# Patient Record
Sex: FEMALE | Race: WHITE | NOT HISPANIC OR LATINO | Employment: OTHER | URBAN - METROPOLITAN AREA
[De-identification: names, ages, dates, MRNs, and addresses within clinical notes are randomized per-mention and may not be internally consistent; named-entity substitution may affect disease eponyms.]

---

## 2021-01-30 ENCOUNTER — IMMUNIZATIONS (OUTPATIENT)
Dept: FAMILY MEDICINE CLINIC | Facility: HOSPITAL | Age: 76
End: 2021-01-30

## 2021-01-30 DIAGNOSIS — Z23 ENCOUNTER FOR IMMUNIZATION: Primary | ICD-10-CM

## 2021-01-30 PROCEDURE — 91301 SARS-COV-2 / COVID-19 MRNA VACCINE (MODERNA) 100 MCG: CPT

## 2021-01-30 PROCEDURE — 0011A SARS-COV-2 / COVID-19 MRNA VACCINE (MODERNA) 100 MCG: CPT

## 2021-02-27 ENCOUNTER — IMMUNIZATIONS (OUTPATIENT)
Dept: FAMILY MEDICINE CLINIC | Facility: HOSPITAL | Age: 76
End: 2021-02-27

## 2021-02-27 DIAGNOSIS — Z23 ENCOUNTER FOR IMMUNIZATION: Primary | ICD-10-CM

## 2021-02-27 PROCEDURE — 0012A SARS-COV-2 / COVID-19 MRNA VACCINE (MODERNA) 100 MCG: CPT

## 2021-02-27 PROCEDURE — 91301 SARS-COV-2 / COVID-19 MRNA VACCINE (MODERNA) 100 MCG: CPT

## 2021-05-25 ENCOUNTER — TELEPHONE (OUTPATIENT)
Dept: OBGYN CLINIC | Facility: CLINIC | Age: 76
End: 2021-05-25

## 2021-05-25 NOTE — TELEPHONE ENCOUNTER
Becky Mormon called stating [facility] needs a new prescription for her zoloft  Advised patient one year supply was transmitted on 3/9/2021  She was attempting to refill off of old,  RX#    She will contact [facility] to initiate refill of new rx from 3/9/2021

## 2022-02-07 PROBLEM — M81.0 AGE-RELATED OSTEOPOROSIS WITHOUT CURRENT PATHOLOGICAL FRACTURE: Status: ACTIVE | Noted: 2022-02-07

## 2022-05-02 PROBLEM — F41.9 ANXIETY: Status: ACTIVE | Noted: 2022-05-02

## 2022-05-03 ENCOUNTER — ANNUAL EXAM (OUTPATIENT)
Dept: OBGYN CLINIC | Facility: CLINIC | Age: 77
End: 2022-05-03
Payer: MEDICARE

## 2022-05-03 VITALS
DIASTOLIC BLOOD PRESSURE: 78 MMHG | BODY MASS INDEX: 36.84 KG/M2 | SYSTOLIC BLOOD PRESSURE: 140 MMHG | WEIGHT: 200.2 LBS | HEIGHT: 62 IN

## 2022-05-03 DIAGNOSIS — F41.9 ANXIETY: ICD-10-CM

## 2022-05-03 DIAGNOSIS — Z01.419 ENCOUNTER FOR GYNECOLOGICAL EXAMINATION (GENERAL) (ROUTINE) WITHOUT ABNORMAL FINDINGS: Primary | ICD-10-CM

## 2022-05-03 DIAGNOSIS — Z12.31 ENCOUNTER FOR SCREENING MAMMOGRAM FOR MALIGNANT NEOPLASM OF BREAST: ICD-10-CM

## 2022-05-03 PROCEDURE — G0101 CA SCREEN;PELVIC/BREAST EXAM: HCPCS | Performed by: OBSTETRICS & GYNECOLOGY

## 2022-05-03 RX ORDER — ASPIRIN 81 MG/1
81 TABLET ORAL DAILY
COMMUNITY
Start: 2021-05-05

## 2022-05-03 RX ORDER — AMOXICILLIN 500 MG/1
CAPSULE ORAL AS NEEDED
COMMUNITY
Start: 2022-03-27

## 2022-05-03 NOTE — ASSESSMENT & PLAN NOTE
All well  Anxiety recently exacerbated with sale of 2 litters of Siamese kittens, in general happy with Zoloft effects  Has concerns because of mother's history of dementia  Will follow up with Dr Geovanni Drummond for possible testing  Normal breast and pelvic exams  Discussed recommendations to d/c pap smear screening in low risk individuals after 65  Mammo order given  Dexa with Dr Rachelle Cerda     Declined colonoscopies in past, had Cologuard in 2017

## 2022-05-03 NOTE — PROGRESS NOTES
Assessment/Plan:    Anxiety - Managed on Zoloft  Still with some anxiety    Encounter for gynecological examination (general) (routine) without abnormal findings  All well  Anxiety recently exacerbated with sale of 2 litters of Siamese kittens, in general happy with Zoloft effects  Has concerns because of mother's history of dementia  Will follow up with Dr Ashley Mancia for possible testing  Normal breast and pelvic exams  Discussed recommendations to d/c pap smear screening in low risk individuals after 65  Mammo order given  Dexa with Dr Stephanie Eugene  Declined colonoscopies in past, had Cologuard in 2017       Diagnoses and all orders for this visit:    Encounter for gynecological examination (general) (routine) without abnormal findings    Encounter for screening mammogram for malignant neoplasm of breast  -     Mammo screening bilateral w 3d & cad; Future    Anxiety - Managed on Zoloft  -     sertraline (ZOLOFT) 50 mg tablet; Take 1 tablet (50 mg total) by mouth daily    Other orders  -     amoxicillin (AMOXIL) 500 mg capsule; as needed  -     aspirin (ECOTRIN LOW STRENGTH) 81 mg EC tablet; Take 81 mg by mouth daily  -     Discontinue: sertraline (ZOLOFT) 50 mg tablet          Subjective:      Patient ID: Bonnie Napier is a 68 y o  female  Here for Medicare biannual breast and pelvic exams  The following portions of the patient's history were reviewed and updated as appropriate:   She  has a past medical history of Anxiety, Colon cancer screening - Cologuard (2017), Fracture, ribs (08/2020), and Osteoporosis  She   Patient Active Problem List    Diagnosis Date Noted    Encounter for gynecological examination (general) (routine) without abnormal findings 05/03/2022    Anxiety - Managed on Zoloft 05/02/2022    Age-related osteoporosis without current pathological fracture 02/07/2022     She  has a past surgical history that includes Tubal ligation; Appendectomy (2018);  Tonsillectomy; Mammo (historical); DXA procedure(historical) (07/15/2020); and Replacement total knee (Right, 05/2021)  Her family history includes Prostate cancer in her father  She  reports that she has never smoked  She has never used smokeless tobacco  She reports that she does not drink alcohol and does not use drugs  Current Outpatient Medications   Medication Sig Dispense Refill    amoxicillin (AMOXIL) 500 mg capsule as needed      aspirin (ECOTRIN LOW STRENGTH) 81 mg EC tablet Take 81 mg by mouth daily      sertraline (ZOLOFT) 50 mg tablet Take 1 tablet (50 mg total) by mouth daily 90 tablet 3     No current facility-administered medications for this visit  She has No Known Allergies       Review of Systems  No PMB, breast, bladder, bowel changes   No new persistent pain, bloating, early satiety or pelvic pressure    Objective:      /78   Ht 5' 2" (1 575 m)   Wt 90 8 kg (200 lb 3 2 oz)   BMI 36 62 kg/m²          Physical Exam  General appearance: no distress, pleasant  Does not appear anxious or depressed  Neck: thyroid without nodules or thyromegaly, no palpable adenopathy  Lymph nodes: no palpable adenopathy  Breasts: no masses, nodes or skin changes  Abdomen: soft, non tender, no palpable masses  Pelvic exam: normal atrophic external genitalia, urethral meatus normal, vagina atrophic without lesions, cervix atrophic without lesions, uterus small, non tender, no adnexal masses, non tender  Rectal exam: normal sphincter tone, no masses, RV confirms above

## 2022-05-03 NOTE — PATIENT INSTRUCTIONS
Return to office in one year unless having any problems such as breast changes, bleeding, new persistent pain, new progressive bloating, new problems eating (getting full to quickly) or new constant urinary pressure that does not resolve in one week  Talk to Dr Claudio Renae about your memory concerns

## 2022-05-03 NOTE — LETTER
May 3, 2022     [facility], [address]  [address] [address] [address]    Patient: Soco Ashraf   YOB: 1945   Date of Visit: 5/3/2022       Dear Dr Tiesha Dempsey: Thank you for referring Soco Ashraf to me for evaluation  Below are my notes for this consultation  If you have questions, please do not hesitate to call me  I look forward to following your patient along with you  Sincerely,        Karena Benavides MD        CC: No Recipients  Karena Benavides MD  5/3/2022  1:17 PM  Incomplete  Assessment/Plan:    Anxiety - Managed on Zoloft  Still with some anxiety    Encounter for gynecological examination (general) (routine) without abnormal findings  All well  Anxiety recently exacerbated with sale of 2 litters of Siamese kittens, in general happy with Zoloft effects  Has concerns because of mother's history of dementia  Will follow up with Dr Tiesha Dempsey for possible testing  Normal breast and pelvic exams  Discussed recommendations to d/c pap smear screening in low risk individuals after 65  Mammo order given  Dexa with Dr Bill Delvalle  Declined colonoscopies in past, had Cologuard in 2017       Diagnoses and all orders for this visit:    Encounter for gynecological examination (general) (routine) without abnormal findings    Encounter for screening mammogram for malignant neoplasm of breast  -     Mammo screening bilateral w 3d & cad; Future    Anxiety - Managed on Zoloft  -     sertraline (ZOLOFT) 50 mg tablet; Take 1 tablet (50 mg total) by mouth daily    Other orders  -     amoxicillin (AMOXIL) 500 mg capsule; as needed  -     aspirin (ECOTRIN LOW STRENGTH) 81 mg EC tablet; Take 81 mg by mouth daily  -     Discontinue: sertraline (ZOLOFT) 50 mg tablet          Subjective:      Patient ID: Soco Ashraf is a 68 y o  female  Here for Medicare biannual breast and pelvic exams         The following portions of the patient's history were reviewed and updated as appropriate:   She has a past medical history of Anxiety, Colon cancer screening - Cologaleksey (2017), Fracture, ribs (08/2020), and Osteoporosis  She   Patient Active Problem List    Diagnosis Date Noted    Encounter for gynecological examination (general) (routine) without abnormal findings 05/03/2022    Anxiety - Managed on Zoloft 05/02/2022    Age-related osteoporosis without current pathological fracture 02/07/2022     She  has a past surgical history that includes Tubal ligation; Appendectomy (2018); Tonsillectomy; Mammo (historical); DXA procedure(historical) (07/15/2020); and Replacement total knee (Right, 05/2021)  Her family history includes Prostate cancer in her father  She  reports that she has never smoked  She has never used smokeless tobacco  She reports that she does not drink alcohol and does not use drugs  Current Outpatient Medications   Medication Sig Dispense Refill    amoxicillin (AMOXIL) 500 mg capsule as needed      aspirin (ECOTRIN LOW STRENGTH) 81 mg EC tablet Take 81 mg by mouth daily      sertraline (ZOLOFT) 50 mg tablet Take 1 tablet (50 mg total) by mouth daily 90 tablet 3     No current facility-administered medications for this visit  She has No Known Allergies       Review of Systems  No PMB, breast, bladder, bowel changes   No new persistent pain, bloating, early satiety or pelvic pressure    Objective:      /78   Ht 5' 2" (1 575 m)   Wt 90 8 kg (200 lb 3 2 oz)   BMI 36 62 kg/m²          Physical Exam  General appearance: no distress, pleasant  Does not appear anxious or depressed  Neck: thyroid without nodules or thyromegaly, no palpable adenopathy  Lymph nodes: no palpable adenopathy  Breasts: no masses, nodes or skin changes  Abdomen: soft, non tender, no palpable masses  Pelvic exam: normal atrophic external genitalia, urethral meatus normal, vagina atrophic without lesions, cervix atrophic without lesions, uterus small, non tender, no adnexal masses, non tender  Rectal exam: normal sphincter tone, no masses, RV confirms above      Kunal Peralta MD  5/3/2022 12:57 PM  Sign when Signing Visit  Assessment/Plan:    No problem-specific Assessment & Plan notes found for this encounter  There are no diagnoses linked to this encounter  Subjective:      Patient ID: Megan Ordoñez is a 68 y o  female  Here for Medicare biannual breast and pelvic exams  The following portions of the patient's history were reviewed and updated as appropriate:   She  has a past medical history of Anxiety, Colon cancer screening - Cologuard (2017), Fracture, ribs (08/2020), and Osteoporosis  She   Patient Active Problem List    Diagnosis Date Noted    Anxiety - Managed on Zoloft 05/02/2022    Age-related osteoporosis without current pathological fracture 02/07/2022     She  has a past surgical history that includes Tubal ligation; Appendectomy (2018); Tonsillectomy; Mammo (historical); and DXA procedure(historical) (07/15/2020)  Her family history includes Prostate cancer in her father  She  reports that she has never smoked  She does not have any smokeless tobacco history on file  No history on file for alcohol use and drug use  No current outpatient medications on file  No current facility-administered medications for this visit  She has no allergies on file       Review of Systems  No PMB, breast, bladder, bowel changes  No new persistent pain, bloating, early satiety or pelvic pressure    Objective: There were no vitals taken for this visit           Physical Exam  General appearance: no distress, pleasant  Neck: thyroid without nodules or thyromegaly, no palpable adenopathy  Lymph nodes: no palpable adenopathy  Breasts: no masses, nodes or skin changes  Abdomen: soft, non tender, no palpable masses  Pelvic exam: normal atrophic external genitalia, urethral meatus normal, vagina atrophic without lesions, cervix atrophic without lesions, uterus small, non tender, no adnexal masses, non tender  Rectal exam: normal sphincter tone, no masses, RV confirms above

## 2023-06-22 ENCOUNTER — TELEPHONE (OUTPATIENT)
Dept: OBGYN CLINIC | Facility: CLINIC | Age: 78
End: 2023-06-22

## 2023-06-22 DIAGNOSIS — F41.9 ANXIETY: ICD-10-CM

## 2023-06-22 NOTE — TELEPHONE ENCOUNTER
Has 18  Fertraline HCL 50 mg left until appointment on 7/28, was unaware her yearly was not made  Requesting to have a month supply order through her [employer] mail order  Call back [phone]

## 2023-06-22 NOTE — TELEPHONE ENCOUNTER
Please review for Sertraline RX refill request    [facility] scheduled for 7/28/23

## 2023-07-27 NOTE — PROGRESS NOTES
Assessment/Plan:    Right lower quadrant pain  Two weeks of mid right lower quadrant intermittent pain and bowels "not right". Questions bloating. Has had increase in anxiety due to 81 yo partner's health issues. No diarrhea/constipation/blood in stool but hard to go. Normal abdominal and pelvic exams. No colonoscopy to date, had cologuard testing 2017  Recommend GI evaluation, referral and provider # given. Anxiety - Managed on Zoloft  Doing well on sertraline in general, some recent stressors. Refills sent. Age-related osteoporosis without current pathological fracture  Follows with Dr Francisco Javier Patel. Encounter for screening mammogram for malignant neoplasm of breast  No change in self or clinical breast exams. Mammo order given, last 8/9/22       Diagnoses and all orders for this visit:    Right lower quadrant pain  -     Ambulatory Referral to Gastroenterology; Future    Anxiety - Managed on Zoloft  -     sertraline (ZOLOFT) 50 mg tablet; Take 1 tablet (50 mg total) by mouth daily    Age-related osteoporosis without current pathological fracture    Encounter for screening mammogram for malignant neoplasm of breast  -     Mammo screening bilateral w 3d & cad; Future    Colon cancer screening  -     Ambulatory Referral to Gastroenterology; Future          Subjective:      Patient ID: Marcus Mcmullen is a 68 y.o. female. HPI Here for annual follow up to sertraline therapy and c/o right sided pain x 2 weeks. The following portions of the patient's history were reviewed and updated as appropriate:   She  has a past medical history of Anxiety, Colon cancer screening - Cologuard (2017), Fracture, ribs (08/2020), and Osteoporosis.   She   Patient Active Problem List    Diagnosis Date Noted   • Right lower quadrant pain 07/28/2023   • Encounter for screening mammogram for malignant neoplasm of breast 07/28/2023   • Encounter for gynecological examination (general) (routine) without abnormal findings 05/03/2022   • Anxiety - Managed on Zoloft 05/02/2022   • Age-related osteoporosis without current pathological fracture 02/07/2022     She  has a past surgical history that includes Tubal ligation; Appendectomy (2018); Tonsillectomy; Mammo (historical); DXA procedure(historical) (07/15/2020); and Replacement total knee (Right, 05/2021). Her family history includes Prostate cancer in her father. She  reports that she has never smoked. She has never been exposed to tobacco smoke. She has never used smokeless tobacco. She reports that she does not drink alcohol and does not use drugs. Current Outpatient Medications   Medication Sig Dispense Refill   • aspirin (ECOTRIN LOW STRENGTH) 81 mg EC tablet Take 81 mg by mouth daily     • sertraline (ZOLOFT) 50 mg tablet Take 1 tablet (50 mg total) by mouth daily 90 tablet 4     No current facility-administered medications for this visit. She has No Known Allergies. .    Review of Systems  RLQ pain x 2 weeks, bowel dysfunction. No breast concerns. No PMB, bladder pressure, early satiety. +weigth gain, ?bloating    Objective:      /84 (BP Location: Left arm, Patient Position: Sitting, Cuff Size: Standard)   Ht 5' 4" (1.626 m)   Wt 91.2 kg (201 lb)   BMI 34.50 kg/m²          Physical Exam    General appearance: no distress, pleasant, anxious  Neck: thyroid without nodules or thyromegaly, no palpable adenopathy  Lymph nodes: no palpable adenopathy  Breasts: no masses, nodes or skin changes  Abdomen: soft, non tender, no palpable masses, no guarding or rebound.  Area of tenderness minimal RLQ to mid right LQ  Pelvic exam: normal atrophic external genitalia, urethral meatus normal, vagina atrophic without lesions, cervix atrophic without lesions, uterus small, non tender, no adnexal masses, non tender  Rectal exam: normal sphincter tone, no masses, RV confirms above

## 2023-07-28 ENCOUNTER — OFFICE VISIT (OUTPATIENT)
Dept: OBGYN CLINIC | Facility: CLINIC | Age: 78
End: 2023-07-28
Payer: MEDICARE

## 2023-07-28 ENCOUNTER — TELEPHONE (OUTPATIENT)
Age: 78
End: 2023-07-28

## 2023-07-28 VITALS
DIASTOLIC BLOOD PRESSURE: 84 MMHG | SYSTOLIC BLOOD PRESSURE: 124 MMHG | WEIGHT: 201 LBS | BODY MASS INDEX: 34.31 KG/M2 | HEIGHT: 64 IN

## 2023-07-28 DIAGNOSIS — Z12.11 COLON CANCER SCREENING: ICD-10-CM

## 2023-07-28 DIAGNOSIS — M81.0 AGE-RELATED OSTEOPOROSIS WITHOUT CURRENT PATHOLOGICAL FRACTURE: ICD-10-CM

## 2023-07-28 DIAGNOSIS — F41.9 ANXIETY: ICD-10-CM

## 2023-07-28 DIAGNOSIS — Z12.31 ENCOUNTER FOR SCREENING MAMMOGRAM FOR MALIGNANT NEOPLASM OF BREAST: ICD-10-CM

## 2023-07-28 DIAGNOSIS — R10.31 RIGHT LOWER QUADRANT PAIN: Primary | ICD-10-CM

## 2023-07-28 PROCEDURE — 99214 OFFICE O/P EST MOD 30 MIN: CPT | Performed by: OBSTETRICS & GYNECOLOGY

## 2023-07-28 NOTE — TELEPHONE ENCOUNTER
Patients GI provider:  Dr. Aguilar Copping     Number to return call: [phone]    Reason for call: Pt calling requesting for forms that needs to be filled emailed to "[employer]    Scheduled procedure/appointment date if applicable: Apt/procedure   9-

## 2023-07-28 NOTE — PATIENT INSTRUCTIONS
Return to office in one year unless having any problems such as breast changes, bleeding, new persistent pain, new progressive bloating, new problems eating (getting full to quickly) or new constant urinary pressure that does not resolve in one week. Call in six months to schedule your annual visit. [facility]: [phone] for your colon cancer screening and abdominal pain.

## 2023-07-28 NOTE — LETTER
July 28, 2023     [place] [address]    Patient: Constantine Pavon   YOB: 1945   Date of Visit: 7/28/2023       Dear Dr. Lebron Pat: Thank you for referring Devika Larry to me for evaluation. Below are my notes for this consultation. If you have questions, please do not hesitate to call me. I look forward to following your patient along with you. Sincerely,        Maegan Bentley MD        CC: No Recipients    Maegan Bentley MD  7/28/2023  2:54 PM  Sign when Signing Visit  Assessment/Plan:    Right lower quadrant pain  Two weeks of mid right lower quadrant intermittent pain and bowels "not right". Questions bloating. Has had increase in anxiety due to 81 yo partner's health issues. No diarrhea/constipation/blood in stool but hard to go. Normal abdominal and pelvic exams. No colonoscopy to date, had cologuard testing 2017  Recommend GI evaluation, referral and provider # given. Anxiety - Managed on Zoloft  Doing well on sertraline in general, some recent stressors. Refills sent. Age-related osteoporosis without current pathological fracture  Follows with Dr Satnam Capellan. Encounter for screening mammogram for malignant neoplasm of breast  No change in self or clinical breast exams. Mammo order given, last 8/9/22      Diagnoses and all orders for this visit:    Right lower quadrant pain  -     Ambulatory Referral to Gastroenterology; Future    Anxiety - Managed on Zoloft  -     sertraline (ZOLOFT) 50 mg tablet; Take 1 tablet (50 mg total) by mouth daily    Age-related osteoporosis without current pathological fracture    Encounter for screening mammogram for malignant neoplasm of breast  -     Mammo screening bilateral w 3d & cad; Future    Colon cancer screening  -     Ambulatory Referral to Gastroenterology; Future         Subjective:     Patient ID: Constantine Pavon is a 68 y.o. female.     HPI Here for annual follow up to sertraline therapy and c/o right sided pain x 2 weeks. The following portions of the patient's history were reviewed and updated as appropriate:   She  has a past medical history of Anxiety, Colon cancer screening - Cologuard (2017), Fracture, ribs (08/2020), and Osteoporosis. She   Patient Active Problem List    Diagnosis Date Noted   • Right lower quadrant pain 07/28/2023   • Encounter for screening mammogram for malignant neoplasm of breast 07/28/2023   • Encounter for gynecological examination (general) (routine) without abnormal findings 05/03/2022   • Anxiety - Managed on Zoloft 05/02/2022   • Age-related osteoporosis without current pathological fracture 02/07/2022     She  has a past surgical history that includes Tubal ligation; Appendectomy (2018); Tonsillectomy; Mammo (historical); DXA procedure(historical) (07/15/2020); and Replacement total knee (Right, 05/2021). Her family history includes Prostate cancer in her father. She  reports that she has never smoked. She has never been exposed to tobacco smoke. She has never used smokeless tobacco. She reports that she does not drink alcohol and does not use drugs. Current Outpatient Medications   Medication Sig Dispense Refill   • aspirin (ECOTRIN LOW STRENGTH) 81 mg EC tablet Take 81 mg by mouth daily     • sertraline (ZOLOFT) 50 mg tablet Take 1 tablet (50 mg total) by mouth daily 90 tablet 4     No current facility-administered medications for this visit. She has No Known Allergies. .    Review of Systems RLQ pain x 2 weeks, bowel dysfunction. No breast concerns. No PMB, bladder pressure, early satiety.    +weigth gain, ?bloating    Objective:      /84 (BP Location: Left arm, Patient Position: Sitting, Cuff Size: Standard)   Ht 5' 4" (1.626 m)   Wt 91.2 kg (201 lb)   BMI 34.50 kg/m²         Physical Exam   General appearance: no distress, pleasant, anxious  Neck: thyroid without nodules or thyromegaly, no palpable adenopathy  Lymph nodes: no palpable adenopathy  Breasts: no masses, nodes or skin changes  Abdomen: soft, non tender, no palpable masses, no guarding or rebound.  Area of tenderness minimal RLQ to mid right LQ  Pelvic exam: normal atrophic external genitalia, urethral meatus normal, vagina atrophic without lesions, cervix atrophic without lesions, uterus small, non tender, no adnexal masses, non tender  Rectal exam: normal sphincter tone, no masses, RV confirms above

## 2023-07-28 NOTE — ASSESSMENT & PLAN NOTE
Two weeks of mid right lower quadrant intermittent pain and bowels "not right". Questions bloating. Has had increase in anxiety due to 79 yo partner's health issues. No diarrhea/constipation/blood in stool but hard to go. Normal abdominal and pelvic exams. No colonoscopy to date, had cologuard testing 2017  Recommend GI evaluation, referral and provider # given.

## 2023-07-31 NOTE — TELEPHONE ENCOUNTER
Called  pt re  forms she needs emailed. No answer. Lvm to call back .  It appears all consents are in and signed PQ

## 2023-08-19 DIAGNOSIS — Z12.31 ENCOUNTER FOR SCREENING MAMMOGRAM FOR MALIGNANT NEOPLASM OF BREAST: ICD-10-CM

## 2023-08-28 ENCOUNTER — TELEPHONE (OUTPATIENT)
Dept: GASTROENTEROLOGY | Facility: CLINIC | Age: 78
End: 2023-08-28

## 2023-09-20 ENCOUNTER — OFFICE VISIT (OUTPATIENT)
Age: 78
End: 2023-09-20
Payer: MEDICARE

## 2023-09-20 VITALS
WEIGHT: 206.4 LBS | SYSTOLIC BLOOD PRESSURE: 126 MMHG | DIASTOLIC BLOOD PRESSURE: 84 MMHG | BODY MASS INDEX: 35.24 KG/M2 | HEIGHT: 64 IN

## 2023-09-20 DIAGNOSIS — Z12.11 COLON CANCER SCREENING: ICD-10-CM

## 2023-09-20 DIAGNOSIS — R10.31 RIGHT LOWER QUADRANT PAIN: Primary | ICD-10-CM

## 2023-09-20 PROCEDURE — 99204 OFFICE O/P NEW MOD 45 MIN: CPT | Performed by: INTERNAL MEDICINE

## 2023-09-20 NOTE — PROGRESS NOTES
[facility] Rosalio Carias [facility] - Outpatient Consultation  Krystina Perryoter 68 y.o. female MRN: [id number]  Encounter: [id number]    ASSESSMENT AND PLAN:      1. Right lower quadrant pain  Patient states:  Chronic, right lower quadrant constant abdominal dull pain. Patient denies:  Weight loss, fever, change in appetite, nocturnal awakening with pain, association with BM, dehydration, electrolyte abnormalities, GIB, anemia, or signs of malnutrition    Differential includes organic disorders including inflammatory, vascular, malignancy, MSK vs functional abdominal pain    We will check blood work as noted below. We will also plan for CT scan to further evaluate. We will also get colorectal cancer screening with Cologuard. If positive will need colonoscopy. - Ambulatory Referral to Gastroenterology  - CBC and differential; Future  - Comprehensive metabolic panel; Future  - C-reactive protein; Future  - Celiac Disease Panel; Future  - CT abdomen pelvis w contrast; Future    3. Colon cancer screening  Patient's last negative Cologuard was in 2017. We will get 1 more Cologuard for colorectal cancer screening. Is overdue for colorectal cancer screening at this time. No prior colonoscopy no family history of colon cancer. Average risk. - Ambulatory Referral to Gastroenterology  - Cologuard      Follow up Appointment: Based on testing    I have spent 40 minutes which was spent on one or more of the following: obtaining and reviewing separately obtained history, performing a medically appropriate examination and evaluation, counseling and educating the patient, ordering medications, tests, and procedures, documenting clinical information in the electronic or other health record, and care coordination. Chief Complaint   Patient presents with   • Abdominal Pain     Pt is experiencing lower abdominal pain on her right side for a month. Pt was recommended by GYN for screening colonoscopy.  Pt noted her bowels are loose off/on but she has been eating more fruit. HPI:       Patient is a 51-year-old female past medical history of anxiety, osteoporosis, who is presenting as a consultation from Dr. Maximino Causey regarding right lower quadrant abdominal pain. Patient states that she has been having mid right lower quadrant intermittent abdominal pains and bloating sensation since July 2023. Also had increased anxiety due to partner's health issues. No diarrhea or constipation or blood in the stool but sometimes has to strain to go to have a bowel movement. Had had a negative Cologuard testing in 2017. No history of colonoscopy in the past.    Past surgical history of tubal ligation, appendectomy    No family history of GI malignancies. Otherwise denies any nausea vomiting, fevers chills, dysphagia heartburn, change of bowel habits, GI bleeding or unintentional weight loss.         Historical Information   Past Medical History:   Diagnosis Date   • Anxiety    • Colon cancer screening - Cologuard 2017   • Fracture, ribs 08/2020    3 ribs   • Osteoporosis      Past Surgical History:   Procedure Laterality Date   • APPENDECTOMY  2018   • DXA PROCEDURE (HISTORICAL)  07/15/2020    osteopenia spine; hip WNL   • MAMMO (HISTORICAL)     • REPLACEMENT TOTAL KNEE Right 05/2021   • TONSILLECTOMY     • TUBAL LIGATION       Social History     Substance and Sexual Activity   Alcohol Use Never     Social History     Substance and Sexual Activity   Drug Use Never     Social History     Tobacco Use   Smoking Status Never   • Passive exposure: Never   Smokeless Tobacco Never     Family History   Problem Relation Age of Onset   • Prostate cancer Father    • Breast cancer Neg Hx    • Uterine cancer Neg Hx    • Ovarian cancer Neg Hx    • Colon cancer Neg Hx        Meds/Allergies     Current Outpatient Medications:   •  aspirin (ECOTRIN LOW STRENGTH) 81 mg EC tablet  •  sertraline (ZOLOFT) 50 mg tablet    No Known Allergies    PHYSICAL EXAM:    Blood pressure 126/84, height 5' 4" (1.626 m), weight 93.6 kg (206 lb 6.4 oz). Body mass index is 35.43 kg/m². General Appearance: NAD, cooperative, alert  Eyes: Anicteric  GI:  Soft, non-tender, non-distended; normal bowel sounds; no masses, no organomegaly   Rectal: Deferred  Musculoskeletal: No edema. Skin:  No jaundice    Lab Results:   No results found for: "WBC", "HGB", "MCV", "PLT", "INR"  No results found for: "NA", "K", "CL", "CO2", "ANIONGAP", "BUN", "CREATININE", "GLUCOSE", "GLUF", "CALCIUM", "CORRECTEDCA", "AST", "ALT", "ALKPHOS", "PROT", "BILITOT", "EGFR"  No results found for: "IRON", "TIBC", "FERRITIN"  No results found for: "LIPASE"    Radiology Results:   No results found.

## 2023-10-01 LAB — COLOGUARD RESULT REPORTABLE: NEGATIVE

## 2023-11-06 ENCOUNTER — NURSE TRIAGE (OUTPATIENT)
Age: 78
End: 2023-11-06

## 2023-11-06 NOTE — TELEPHONE ENCOUNTER
Regarding: results  ----- Message from [facility] sent at 11/6/2023  4:22 PM EST -----  Patients GI provider:  Dr. Alfred Garcia     Number to return call: [phone]     Reason for call: Pt calling requesting to speak with someone regarding results of CT Scan    Scheduled procedure/appointment date if applicable: Apt/procedure